# Patient Record
Sex: MALE | Race: BLACK OR AFRICAN AMERICAN | Employment: OTHER | URBAN - METROPOLITAN AREA
[De-identification: names, ages, dates, MRNs, and addresses within clinical notes are randomized per-mention and may not be internally consistent; named-entity substitution may affect disease eponyms.]

---

## 2020-05-15 ENCOUNTER — PROCEDURE VISIT (OUTPATIENT)
Dept: AUDIOLOGY | Age: 65
End: 2020-05-15
Payer: MEDICARE

## 2020-05-15 ENCOUNTER — OFFICE VISIT (OUTPATIENT)
Dept: ENT CLINIC | Age: 65
End: 2020-05-15
Payer: MEDICARE

## 2020-05-15 VITALS
HEIGHT: 68 IN | HEART RATE: 58 BPM | SYSTOLIC BLOOD PRESSURE: 114 MMHG | OXYGEN SATURATION: 96 % | TEMPERATURE: 98 F | WEIGHT: 163 LBS | DIASTOLIC BLOOD PRESSURE: 67 MMHG | BODY MASS INDEX: 24.71 KG/M2

## 2020-05-15 PROCEDURE — 92557 COMPREHENSIVE HEARING TEST: CPT | Performed by: AUDIOLOGIST

## 2020-05-15 PROCEDURE — 3017F COLORECTAL CA SCREEN DOC REV: CPT | Performed by: OTOLARYNGOLOGY

## 2020-05-15 PROCEDURE — 1123F ACP DISCUSS/DSCN MKR DOCD: CPT | Performed by: OTOLARYNGOLOGY

## 2020-05-15 PROCEDURE — G8427 DOCREV CUR MEDS BY ELIG CLIN: HCPCS | Performed by: OTOLARYNGOLOGY

## 2020-05-15 PROCEDURE — 99203 OFFICE O/P NEW LOW 30 MIN: CPT | Performed by: OTOLARYNGOLOGY

## 2020-05-15 PROCEDURE — 4040F PNEUMOC VAC/ADMIN/RCVD: CPT | Performed by: OTOLARYNGOLOGY

## 2020-05-15 PROCEDURE — 4004F PT TOBACCO SCREEN RCVD TLK: CPT | Performed by: OTOLARYNGOLOGY

## 2020-05-15 PROCEDURE — G8417 CALC BMI ABV UP PARAM F/U: HCPCS | Performed by: OTOLARYNGOLOGY

## 2020-05-15 RX ORDER — METHYLPREDNISOLONE 4 MG/1
4 TABLET ORAL SEE ADMIN INSTRUCTIONS
Qty: 1 KIT | Refills: 0 | Status: SHIPPED | OUTPATIENT
Start: 2020-05-15 | End: 2020-07-17 | Stop reason: ALTCHOICE

## 2020-05-15 ASSESSMENT — ENCOUNTER SYMPTOMS
RHINORRHEA: 0
SORE THROAT: 0
TROUBLE SWALLOWING: 0
EYES NEGATIVE: 1
SINUS PAIN: 0
ALLERGIC/IMMUNOLOGIC NEGATIVE: 1
SINUS PRESSURE: 0
FACIAL SWELLING: 0
VOICE CHANGE: 0
RESPIRATORY NEGATIVE: 1

## 2020-06-01 ENCOUNTER — TELEPHONE (OUTPATIENT)
Dept: ENT CLINIC | Age: 65
End: 2020-06-01

## 2020-06-01 RX ORDER — DOXYCYCLINE 100 MG/1
100 TABLET ORAL 2 TIMES DAILY
Qty: 20 TABLET | Refills: 0 | Status: SHIPPED | OUTPATIENT
Start: 2020-06-01 | End: 2020-06-11

## 2020-06-01 RX ORDER — METHYLPREDNISOLONE 4 MG/1
4 TABLET ORAL SEE ADMIN INSTRUCTIONS
Qty: 1 KIT | Refills: 0 | Status: SHIPPED | OUTPATIENT
Start: 2020-06-01 | End: 2020-07-17 | Stop reason: ALTCHOICE

## 2020-06-01 NOTE — TELEPHONE ENCOUNTER
Call from Pt, initial medication helped a lot. Ear still feels full sometimes, some discomfort and frequent  Headaches. Requesting refill or another medication. Please advise.

## 2020-06-19 ENCOUNTER — OFFICE VISIT (OUTPATIENT)
Dept: ENT CLINIC | Age: 65
End: 2020-06-19
Payer: MEDICARE

## 2020-06-19 ENCOUNTER — TELEPHONE (OUTPATIENT)
Dept: ENT CLINIC | Age: 65
End: 2020-06-19

## 2020-06-19 VITALS
BODY MASS INDEX: 24.54 KG/M2 | DIASTOLIC BLOOD PRESSURE: 76 MMHG | WEIGHT: 159 LBS | SYSTOLIC BLOOD PRESSURE: 105 MMHG | HEART RATE: 60 BPM | TEMPERATURE: 97.8 F

## 2020-06-19 PROCEDURE — 4004F PT TOBACCO SCREEN RCVD TLK: CPT | Performed by: OTOLARYNGOLOGY

## 2020-06-19 PROCEDURE — G8427 DOCREV CUR MEDS BY ELIG CLIN: HCPCS | Performed by: OTOLARYNGOLOGY

## 2020-06-19 PROCEDURE — 99213 OFFICE O/P EST LOW 20 MIN: CPT | Performed by: OTOLARYNGOLOGY

## 2020-06-19 PROCEDURE — 4040F PNEUMOC VAC/ADMIN/RCVD: CPT | Performed by: OTOLARYNGOLOGY

## 2020-06-19 PROCEDURE — G8420 CALC BMI NORM PARAMETERS: HCPCS | Performed by: OTOLARYNGOLOGY

## 2020-06-19 PROCEDURE — 3017F COLORECTAL CA SCREEN DOC REV: CPT | Performed by: OTOLARYNGOLOGY

## 2020-06-19 PROCEDURE — 1123F ACP DISCUSS/DSCN MKR DOCD: CPT | Performed by: OTOLARYNGOLOGY

## 2020-06-19 RX ORDER — BUTALBITAL, ACETAMINOPHEN AND CAFFEINE 50; 325; 40 MG/1; MG/1; MG/1
1 TABLET ORAL EVERY 4 HOURS PRN
Qty: 30 TABLET | Refills: 0 | Status: SHIPPED | OUTPATIENT
Start: 2020-06-19

## 2020-06-19 NOTE — TELEPHONE ENCOUNTER
Nel Isbell calling from [facility] scheduling,   MRI needs to be ordered W ith  and With out  Contrast           Or just With out    Contrast     Please place new order for MRI

## 2020-07-07 ENCOUNTER — HOSPITAL ENCOUNTER (OUTPATIENT)
Dept: MRI IMAGING | Age: 65
Discharge: HOME | End: 2020-07-07
Payer: MEDICARE

## 2020-07-07 LAB
CREAT SERPL-MCNC: 1 MG/DL (ref 0.8–1.3)
GFR AFRICAN AMERICAN: >60
GFR NON-AFRICAN AMERICAN: >60

## 2020-07-07 PROCEDURE — 70553 MRI BRAIN STEM W/O & W/DYE: CPT

## 2020-07-07 PROCEDURE — A9577 INJ MULTIHANCE: HCPCS | Performed by: OTOLARYNGOLOGY

## 2020-07-07 PROCEDURE — 36415 COLL VENOUS BLD VENIPUNCTURE: CPT

## 2020-07-07 PROCEDURE — 82565 ASSAY OF CREATININE: CPT

## 2020-07-07 PROCEDURE — 6360000004 HC RX CONTRAST MEDICATION: Performed by: OTOLARYNGOLOGY

## 2020-07-07 RX ADMIN — GADOBENATE DIMEGLUMINE 14 ML: 529 INJECTION, SOLUTION INTRAVENOUS at 11:17

## 2020-07-13 ENCOUNTER — TELEPHONE (OUTPATIENT)
Dept: ENT CLINIC | Age: 65
End: 2020-07-13

## 2020-07-13 NOTE — TELEPHONE ENCOUNTER
Patient calling for MRI results,  He was given Dr Norberto Hernandez recommendations,    And he did schedule his f/u appointment , Patient happy

## 2020-07-17 ENCOUNTER — OFFICE VISIT (OUTPATIENT)
Dept: ENT CLINIC | Age: 65
End: 2020-07-17
Payer: MEDICARE

## 2020-07-17 ENCOUNTER — TELEPHONE (OUTPATIENT)
Dept: ENT CLINIC | Age: 65
End: 2020-07-17

## 2020-07-17 VITALS — TEMPERATURE: 97.8 F | SYSTOLIC BLOOD PRESSURE: 111 MMHG | HEART RATE: 62 BPM | DIASTOLIC BLOOD PRESSURE: 69 MMHG

## 2020-07-17 PROCEDURE — 4040F PNEUMOC VAC/ADMIN/RCVD: CPT | Performed by: OTOLARYNGOLOGY

## 2020-07-17 PROCEDURE — 99213 OFFICE O/P EST LOW 20 MIN: CPT | Performed by: OTOLARYNGOLOGY

## 2020-07-17 PROCEDURE — G8420 CALC BMI NORM PARAMETERS: HCPCS | Performed by: OTOLARYNGOLOGY

## 2020-07-17 PROCEDURE — 3017F COLORECTAL CA SCREEN DOC REV: CPT | Performed by: OTOLARYNGOLOGY

## 2020-07-17 PROCEDURE — 1123F ACP DISCUSS/DSCN MKR DOCD: CPT | Performed by: OTOLARYNGOLOGY

## 2020-07-17 PROCEDURE — G8427 DOCREV CUR MEDS BY ELIG CLIN: HCPCS | Performed by: OTOLARYNGOLOGY

## 2020-07-17 PROCEDURE — 4004F PT TOBACCO SCREEN RCVD TLK: CPT | Performed by: OTOLARYNGOLOGY

## 2020-07-17 NOTE — PROGRESS NOTES
Patient continues to have headaches which she describes as occurring when he is bending over turning his head and also with coughing. Headaches are located in the entire frontal and occipital regions. He denies any eye symptoms and has had no nasal or ear symptoms as well. He has had no loss of consciousness. He is unable to take some of the medications I have prescribed but does do fairly well with the use of ibuprofen for presence of the headaches. MRI scan was totally unremarkable for either sinus or cranial disease. Currently, he appears in no obvious acute distress. There are no focal neurologic signs present. Pupils are equal round regular and reactive to light and accommodation with extraocular movements intact and full. The ears reveal normal tympanic membranes and ear canals bilaterally. Oral examination remains unremarkable. The nasal mucosa is mildly edematous but there is no evidence of purulent drainage or obstruction. The neck remains free of any adenopathy, mass, thyroid enlargement and is supple. He has had a recent eye evaluation which did not reveal any significant pathology. Therefore, I have suggested a neurologic evaluation with a concern as to possible elevated intracranial pressure.

## 2020-09-02 ENCOUNTER — OFFICE VISIT (OUTPATIENT)
Dept: NEUROLOGY | Age: 65
End: 2020-09-02
Payer: MEDICARE

## 2020-09-02 VITALS
BODY MASS INDEX: 24.1 KG/M2 | DIASTOLIC BLOOD PRESSURE: 70 MMHG | SYSTOLIC BLOOD PRESSURE: 107 MMHG | WEIGHT: 159 LBS | HEIGHT: 68 IN | HEART RATE: 62 BPM

## 2020-09-02 PROCEDURE — 4004F PT TOBACCO SCREEN RCVD TLK: CPT | Performed by: PSYCHIATRY & NEUROLOGY

## 2020-09-02 PROCEDURE — 3017F COLORECTAL CA SCREEN DOC REV: CPT | Performed by: PSYCHIATRY & NEUROLOGY

## 2020-09-02 PROCEDURE — G8420 CALC BMI NORM PARAMETERS: HCPCS | Performed by: PSYCHIATRY & NEUROLOGY

## 2020-09-02 PROCEDURE — G8427 DOCREV CUR MEDS BY ELIG CLIN: HCPCS | Performed by: PSYCHIATRY & NEUROLOGY

## 2020-09-02 PROCEDURE — 1123F ACP DISCUSS/DSCN MKR DOCD: CPT | Performed by: PSYCHIATRY & NEUROLOGY

## 2020-09-02 PROCEDURE — 99204 OFFICE O/P NEW MOD 45 MIN: CPT | Performed by: PSYCHIATRY & NEUROLOGY

## 2020-09-02 PROCEDURE — 4040F PNEUMOC VAC/ADMIN/RCVD: CPT | Performed by: PSYCHIATRY & NEUROLOGY

## 2020-09-02 RX ORDER — CLONIDINE HYDROCHLORIDE 0.1 MG/1
TABLET ORAL
COMMUNITY
Start: 2020-08-24

## 2020-09-02 RX ORDER — LISINOPRIL 10 MG/1
TABLET ORAL
COMMUNITY
Start: 2020-07-26

## 2020-09-02 RX ORDER — HYDROCHLOROTHIAZIDE 12.5 MG/1
TABLET ORAL PRN
COMMUNITY
Start: 2020-07-26

## 2020-09-02 RX ORDER — MULTIVITAMIN WITH FOLIC ACID 400 MCG
TABLET ORAL
COMMUNITY
Start: 2020-07-19

## 2020-09-02 RX ORDER — ALPRAZOLAM 1 MG/1
TABLET ORAL
COMMUNITY
Start: 2020-07-30

## 2020-09-02 RX ORDER — CHOLECALCIFEROL (VITAMIN D3) 1250 MCG
CAPSULE ORAL
COMMUNITY
Start: 2020-07-19

## 2020-09-02 NOTE — PROGRESS NOTES
Frequency   []  Hematuria     []  Urinary incontinence           [x] Denies all of the above     Hematologic/lymphatic:  []  Bleeding    []  Easy bruising   []  Anemia  [x] Denies all of the above     Musculoskeletal:   [] Back pain       []  Myalgias    []  Neck pain           [x] Denies all of the above    Neurological: As noted in HPI    Behavioral/Psych:   [x] Anxiety    []  Depression     []  Mood swings     [] Denies all of the above     Endocrine:   []  Temperature intolerance     [] Fatigue      [x] Denies all of the above     Allergic/Immunologic:   [] Hay fever    [x] Denies all of the above     History reviewed. No pertinent past medical history. History reviewed. No pertinent family history.   Social History     Socioeconomic History    Marital status: Single     Spouse name: None    Number of children: None    Years of education: None    Highest education level: None   Occupational History    None   Social Needs    Financial resource strain: None    Food insecurity     Worry: None     Inability: None    Transportation needs     Medical: None     Non-medical: None   Tobacco Use    Smoking status: Current Every Day Smoker     Packs/day: 1.00     Types: Cigarettes    Smokeless tobacco: Never Used   Substance and Sexual Activity    Alcohol use: None     Comment: Ocassional    Drug use: Yes     Types: Marijuana     Comment: Ocassional    Sexual activity: None   Lifestyle    Physical activity     Days per week: None     Minutes per session: None    Stress: None   Relationships    Social connections     Talks on phone: None     Gets together: None     Attends Oriental orthodox service: None     Active member of club or organization: None     Attends meetings of clubs or organizations: None     Relationship status: None    Intimate partner violence     Fear of current or ex partner: None     Emotionally abused: None     Physically abused: None     Forced sexual activity: None   Other Topics Concern    None   Social History Narrative    None       PHYSICAL EXAMINATION:  /70   Pulse 62   Ht 5' 7.5\" (1.715 m)   Wt 159 lb (72.1 kg)   BMI 24.54 kg/m²   Appearance: Well appearing, well nourished and in no distress  Mental Status Exam: Patient is alert, oriented to person, place and time. Recent and remote memory is normal  Fund of Knowledge is normal  Attention/concentration is normal.   Speech : No dysarthria  Language : No aphasia  Funduscopic Exam: sharp disc margins  Cranial Nerves:   II: Visual fields:  Full to confrontation  III: Pupils:  equal, round, reactive to light  III,IV,VI: Extra Ocular Movements are intact. No nystagmus  V: Facial sensation is intact to pin prick and light touch  VII: Facial strength and movements: intact and symmetric smile,cheek puffing and eyebrow elevation  VIII: Hearing:  Intact to finger rub bilaterally  IX: Palate  elevation is symmetric  XI: Shoulder shrug is intact  XII: Tongue movements are normal  Motor:  Muscle tone and bulk are normal.   Strength is symmetrical 5/5 in all four extremities. Sensory: Intact to light touch and  pin prick in all four extremities  Coordination:  Normal  Finger to Nose and Heel to Shin bilaterally    . Reflexes:  DTR 1 and symmetric bilaterally  Plantar response: Flexor bilaterally  Gait: Gait and station is normal. Patient can toe/ heel and tandem walk without difficulty  Romberg: negative  Vascular: No carotid bruit bilaterally        DATA:  LABS:  General Labs:    BMP:    Lab Results   Component Value Date    CREATININE 1.0 07/07/2020    GFRAA >60 07/07/2020    LABGLOM >60 07/07/2020     MRI brain images were reviewed and were normal    IMPRESSION :  Exertional headache  Consider and rule out intracranial aneurysm although it is less likely  MRI brain images were independently reviewed and there is no evidence for intracranial structural lesion. Patient has some temporal tenderness on the left side.   Consider and rule out temporal arteritis  There is also some tenderness over the greater occipital nerve on the left side and occipital neuralgia will also be considered. RECOMMENDATIONS :  Discussed with patient  Discussed about the differential diagnosis  I will get sedimentation rate and C-reactive protein to rule out temporal arteritis  I will get CT angiogram of the head to rule out an intracranial aneurysm  If these are normal then we would consider symptomatic management  Thank you for this consultation        Please note a portion of this chart was generated using dragon dictation software. Although every effort was made to ensure the accuracy of this automated transcription, some errors in transcription may have occurred.

## 2020-09-08 ENCOUNTER — HOSPITAL ENCOUNTER (OUTPATIENT)
Age: 65
Discharge: HOME | End: 2020-09-08
Payer: MEDICARE

## 2020-09-08 LAB
C-REACTIVE PROTEIN: 4.2 MG/L (ref 0–5.1)
SEDIMENTATION RATE, ERYTHROCYTE: 8 MM/HR (ref 0–20)

## 2020-09-08 PROCEDURE — 85652 RBC SED RATE AUTOMATED: CPT

## 2020-09-08 PROCEDURE — 86140 C-REACTIVE PROTEIN: CPT

## 2020-09-08 PROCEDURE — 36415 COLL VENOUS BLD VENIPUNCTURE: CPT

## 2020-09-15 ENCOUNTER — HOSPITAL ENCOUNTER (OUTPATIENT)
Dept: CT IMAGING | Age: 65
Discharge: HOME | End: 2020-09-15
Payer: MEDICARE

## 2020-09-15 ENCOUNTER — TELEPHONE (OUTPATIENT)
Dept: NEUROLOGY | Age: 65
End: 2020-09-15

## 2020-09-15 LAB
BUN BLDV-MCNC: 14 MG/DL (ref 7–20)
CREAT SERPL-MCNC: 1 MG/DL (ref 0.8–1.3)
GFR AFRICAN AMERICAN: >60
GFR NON-AFRICAN AMERICAN: >60

## 2020-09-15 PROCEDURE — 36415 COLL VENOUS BLD VENIPUNCTURE: CPT

## 2020-09-15 PROCEDURE — 6360000004 HC RX CONTRAST MEDICATION: Performed by: PSYCHIATRY & NEUROLOGY

## 2020-09-15 PROCEDURE — 82565 ASSAY OF CREATININE: CPT

## 2020-09-15 PROCEDURE — 84520 ASSAY OF UREA NITROGEN: CPT

## 2020-09-15 PROCEDURE — 70496 CT ANGIOGRAPHY HEAD: CPT

## 2020-09-15 RX ADMIN — IOPAMIDOL 75 ML: 755 INJECTION, SOLUTION INTRAVENOUS at 09:23

## 2020-09-17 ENCOUNTER — TELEPHONE (OUTPATIENT)
Dept: NEUROLOGY | Age: 65
End: 2020-09-17

## 2020-09-17 NOTE — TELEPHONE ENCOUNTER
----- Message from Shahid Solo MD sent at 9/17/2020 10:09 AM EDT -----  Please call patient and inform him that his CT angiogram was normal.  There was no evidence of any aneurysm or other abnormality.   Keep scheduled follow-up appointment

## 2020-10-06 ENCOUNTER — OFFICE VISIT (OUTPATIENT)
Dept: NEUROLOGY | Age: 65
End: 2020-10-06
Payer: MEDICARE

## 2020-10-06 VITALS
HEIGHT: 68 IN | WEIGHT: 159 LBS | HEART RATE: 57 BPM | SYSTOLIC BLOOD PRESSURE: 111 MMHG | BODY MASS INDEX: 24.1 KG/M2 | DIASTOLIC BLOOD PRESSURE: 68 MMHG

## 2020-10-06 PROCEDURE — G8427 DOCREV CUR MEDS BY ELIG CLIN: HCPCS | Performed by: PSYCHIATRY & NEUROLOGY

## 2020-10-06 PROCEDURE — 3017F COLORECTAL CA SCREEN DOC REV: CPT | Performed by: PSYCHIATRY & NEUROLOGY

## 2020-10-06 PROCEDURE — 4004F PT TOBACCO SCREEN RCVD TLK: CPT | Performed by: PSYCHIATRY & NEUROLOGY

## 2020-10-06 PROCEDURE — 4040F PNEUMOC VAC/ADMIN/RCVD: CPT | Performed by: PSYCHIATRY & NEUROLOGY

## 2020-10-06 PROCEDURE — G8484 FLU IMMUNIZE NO ADMIN: HCPCS | Performed by: PSYCHIATRY & NEUROLOGY

## 2020-10-06 PROCEDURE — 1123F ACP DISCUSS/DSCN MKR DOCD: CPT | Performed by: PSYCHIATRY & NEUROLOGY

## 2020-10-06 PROCEDURE — 99213 OFFICE O/P EST LOW 20 MIN: CPT | Performed by: PSYCHIATRY & NEUROLOGY

## 2020-10-06 PROCEDURE — G8420 CALC BMI NORM PARAMETERS: HCPCS | Performed by: PSYCHIATRY & NEUROLOGY

## 2020-10-06 NOTE — PROGRESS NOTES
[facility]   Neurology followup    Subjective:   CC/HP  History was obtained from the patient. Patient states that he continues to have headaches on and off. It may be slightly better. He has good days and bad days. No clear aggravating or relieving factors. The pain could be in the frontal temporal or occipital regions and sometimes could be a band around the head. CT angiogram did not show any evidence of aneurysm. MRI brain did not show any structural abnormalities. Sedimentation rate was normal    REVIEW OF SYSTEMS    Constitutional:  []   Chills   []  Fatigue   []  Fevers   []  Malaise   []  Weight loss     [x] Denies all of the above    Respiratory:   []  Cough    []  Shortness of breath         [x] Denies all of the above     Cardiovascular:   []  Chest pain    []  Exertional chest pressure/discomfort           [] Palpitations    []  Syncope     [x] Denies all of the above        History reviewed. No pertinent past medical history. History reviewed. No pertinent family history.   Social History     Socioeconomic History    Marital status: Single     Spouse name: None    Number of children: None    Years of education: None    Highest education level: None   Occupational History    None   Social Needs    Financial resource strain: None    Food insecurity     Worry: None     Inability: None    Transportation needs     Medical: None     Non-medical: None   Tobacco Use    Smoking status: Current Every Day Smoker     Packs/day: 1.00     Types: Cigarettes    Smokeless tobacco: Never Used   Substance and Sexual Activity    Alcohol use: None     Comment: Ocassional    Drug use: Yes     Types: Marijuana     Comment: Ocassional    Sexual activity: None   Lifestyle    Physical activity     Days per week: None     Minutes per session: None    Stress: None   Relationships    Social connections     Talks on phone: None     Gets together: None     Attends Voodoo service: None     Active member of club or organization: None     Attends meetings of clubs or organizations: None     Relationship status: None    Intimate partner violence     Fear of current or ex partner: None     Emotionally abused: None     Physically abused: None     Forced sexual activity: None   Other Topics Concern    None   Social History Narrative    None        Objective:  Exam:  Ht 5' 7.5\" (1.715 m)   Wt 159 lb (72.1 kg)   BMI 24.54 kg/m²   This is a well-nourished patient in no acute distress  Patient is awake, alert and oriented x3. Speech is normal.  Pupils are equal round reacting to light. Extraocular movements intact. Face symmetrical. Tongue midline. Motor exam shows normal symmetrical strength. Deep tendon reflexes normal. Plantar reflexes downgoing. Sensory exam normal. Coordination normal. Gait normal. No carotid bruit. No neck stiffness. Data :  LABS:  General Labs:    BMP:    Lab Results   Component Value Date    BUN 14 09/15/2020    CREATININE 1.0 09/15/2020    GFRAA >60 09/15/2020    LABGLOM >60 09/15/2020     RADIOLOGY REVIEW:  I have reviewed radiology image(s) and reports(s) of: MRI brain and CT angiogram    Impression :  Probable muscle contraction headache  No evidence of temporal arteritis  No evidence of cerebral aneurysm or structural lesion of the brain    Plan :  Discussed with patient  Explained the test results  Discussed medication options  We finally decided that he will monitor the headaches to see how often they are and how severe they are over the next few months and if they get severe we will try some medication  I will see him on a as needed basis; he will call me if needed. Please note a portion of  this chart was generated using dragon dictation software. Although every effort was made to ensure the accuracy of this automated transcription, some errors in transcription may have occurred.

## 2024-05-02 ENCOUNTER — OFFICE VISIT (OUTPATIENT)
Dept: FAMILY MEDICINE CLINIC | Age: 69
End: 2024-05-02

## 2024-05-02 VITALS
WEIGHT: 152.8 LBS | DIASTOLIC BLOOD PRESSURE: 68 MMHG | BODY MASS INDEX: 23.16 KG/M2 | HEIGHT: 68 IN | HEART RATE: 62 BPM | OXYGEN SATURATION: 98 % | SYSTOLIC BLOOD PRESSURE: 102 MMHG

## 2024-05-02 DIAGNOSIS — Z76.89 ENCOUNTER TO ESTABLISH CARE: ICD-10-CM

## 2024-05-02 DIAGNOSIS — Z01.818 PREOP EXAMINATION: ICD-10-CM

## 2024-05-02 DIAGNOSIS — Z12.5 PROSTATE CANCER SCREENING: ICD-10-CM

## 2024-05-02 DIAGNOSIS — Z00.00 ANNUAL PHYSICAL EXAM: Primary | ICD-10-CM

## 2024-05-02 SDOH — ECONOMIC STABILITY: HOUSING INSECURITY
IN THE LAST 12 MONTHS, WAS THERE A TIME WHEN YOU DID NOT HAVE A STEADY PLACE TO SLEEP OR SLEPT IN A SHELTER (INCLUDING NOW)?: NO

## 2024-05-02 SDOH — ECONOMIC STABILITY: FOOD INSECURITY: WITHIN THE PAST 12 MONTHS, YOU WORRIED THAT YOUR FOOD WOULD RUN OUT BEFORE YOU GOT MONEY TO BUY MORE.: NEVER TRUE

## 2024-05-02 SDOH — ECONOMIC STABILITY: FOOD INSECURITY: WITHIN THE PAST 12 MONTHS, THE FOOD YOU BOUGHT JUST DIDN'T LAST AND YOU DIDN'T HAVE MONEY TO GET MORE.: NEVER TRUE

## 2024-05-02 SDOH — ECONOMIC STABILITY: INCOME INSECURITY: HOW HARD IS IT FOR YOU TO PAY FOR THE VERY BASICS LIKE FOOD, HOUSING, MEDICAL CARE, AND HEATING?: NOT HARD AT ALL

## 2024-05-02 ASSESSMENT — PATIENT HEALTH QUESTIONNAIRE - PHQ9
SUM OF ALL RESPONSES TO PHQ9 QUESTIONS 1 & 2: 0
SUM OF ALL RESPONSES TO PHQ QUESTIONS 1-9: 0
1. LITTLE INTEREST OR PLEASURE IN DOING THINGS: NOT AT ALL
SUM OF ALL RESPONSES TO PHQ QUESTIONS 1-9: 0
2. FEELING DOWN, DEPRESSED OR HOPELESS: NOT AT ALL

## 2024-05-02 ASSESSMENT — ENCOUNTER SYMPTOMS
COUGH: 0
SHORTNESS OF BREATH: 0
CONSTIPATION: 0
DIARRHEA: 0

## 2024-05-02 NOTE — PROGRESS NOTES
Sharath Ruiz is a 68 y.o. year old male here for:    Chief Complaint:    Chief Complaint   Patient presents with    New Patient     Pt is establishing care     Pre-op Exam     Cataract surgery       Subjective:         HPI:       Patient presenting to establish care.  No significant concerns at this time and he is not taking any medications.  He states that he feels well overall.  He does have upcoming cataract surgery planned, but does not have a specific date.  He does take a vitamin D supplement.  No significant family or personal history of any illness.  He is a smoker, but is in the process of trying to quit.  He is established with an eye doctor, but not a dentist.  No other significant concerns.    History reviewed. No pertinent past medical history.  Social History     Tobacco Use    Smoking status: Every Day     Current packs/day: 1.00     Average packs/day: 1 pack/day for 55.3 years (55.3 ttl pk-yrs)     Types: Cigarettes     Start date: 1/1/1969    Smokeless tobacco: Never   Substance Use Topics    Drug use: Yes     Types: Marijuana (Weed)     Comment: Ocassional     History reviewed. No pertinent family history.  History reviewed. No pertinent surgical history.      Current Outpatient Medications:     Cholecalciferol (VITAMIN D3) 1.25 MG (71588 UT) CAPS, TAKE 1 CAPSULE BY MOUTH ONCE A WEEK, Disp: , Rfl:   Allergies   Allergen Reactions    Bactrim        Review of Systems:  Review of Systems   Constitutional:  Negative for fatigue and fever.   HENT:  Negative for congestion.    Respiratory:  Negative for cough and shortness of breath.    Cardiovascular:  Negative for leg swelling.   Gastrointestinal:  Negative for constipation and diarrhea.   Genitourinary:  Negative for dysuria.   Neurological:  Negative for headaches.   Psychiatric/Behavioral:  Negative for sleep disturbance. The patient is not nervous/anxious.        Objective:    Physical Exam:  Vitals:    05/02/24 1454   BP: 102/68   Pulse: 62

## 2024-05-02 NOTE — PATIENT INSTRUCTIONS
About The New Low-Dose CT Lung Cancer Screening Test  [employer] Association  - Low Dose CT Scan of the Chest (Saved By the Scan)

## 2024-05-03 ENCOUNTER — TELEPHONE (OUTPATIENT)
Dept: FAMILY MEDICINE CLINIC | Age: 69
End: 2024-05-03

## 2024-05-03 DIAGNOSIS — Z01.818 PREOP EXAMINATION: ICD-10-CM

## 2024-05-03 DIAGNOSIS — Z00.00 ANNUAL PHYSICAL EXAM: ICD-10-CM

## 2024-05-03 DIAGNOSIS — Z12.5 PROSTATE CANCER SCREENING: ICD-10-CM

## 2024-05-03 LAB
ALBUMIN SERPL-MCNC: 4.2 G/DL (ref 3.4–5)
ALBUMIN/GLOB SERPL: 1.7 {RATIO} (ref 1.1–2.2)
ALP SERPL-CCNC: 58 U/L (ref 40–129)
ALT SERPL-CCNC: 20 U/L (ref 10–40)
ANION GAP SERPL CALCULATED.3IONS-SCNC: 13 MMOL/L (ref 3–16)
AST SERPL-CCNC: 17 U/L (ref 15–37)
BASOPHILS # BLD: 0.1 K/UL (ref 0–0.2)
BASOPHILS NFR BLD: 0.9 %
BILIRUB SERPL-MCNC: 0.4 MG/DL (ref 0–1)
BUN SERPL-MCNC: 14 MG/DL (ref 7–20)
CALCIUM SERPL-MCNC: 9.5 MG/DL (ref 8.3–10.6)
CHLORIDE SERPL-SCNC: 104 MMOL/L (ref 99–110)
CHOLEST SERPL-MCNC: 218 MG/DL (ref 0–199)
CO2 SERPL-SCNC: 23 MMOL/L (ref 21–32)
CREAT SERPL-MCNC: 0.9 MG/DL (ref 0.8–1.3)
DEPRECATED RDW RBC AUTO: 14.2 % (ref 12.4–15.4)
EOSINOPHIL # BLD: 0.3 K/UL (ref 0–0.6)
EOSINOPHIL NFR BLD: 4.1 %
GFR SERPLBLD CREATININE-BSD FMLA CKD-EPI: >90 ML/MIN/{1.73_M2}
GLUCOSE SERPL-MCNC: 86 MG/DL (ref 70–99)
HCT VFR BLD AUTO: 42.4 % (ref 40.5–52.5)
HDLC SERPL-MCNC: 69 MG/DL (ref 40–60)
HGB BLD-MCNC: 14.3 G/DL (ref 13.5–17.5)
LDLC SERPL CALC-MCNC: 136 MG/DL
LYMPHOCYTES # BLD: 1.9 K/UL (ref 1–5.1)
LYMPHOCYTES NFR BLD: 30.1 %
MCH RBC QN AUTO: 30.3 PG (ref 26–34)
MCHC RBC AUTO-ENTMCNC: 33.7 G/DL (ref 31–36)
MCV RBC AUTO: 89.9 FL (ref 80–100)
MONOCYTES # BLD: 0.5 K/UL (ref 0–1.3)
MONOCYTES NFR BLD: 8.8 %
NEUTROPHILS # BLD: 3.5 K/UL (ref 1.7–7.7)
NEUTROPHILS NFR BLD: 56.1 %
PLATELET # BLD AUTO: 193 K/UL (ref 135–450)
PMV BLD AUTO: 9.5 FL (ref 5–10.5)
POTASSIUM SERPL-SCNC: 4.2 MMOL/L (ref 3.5–5.1)
PROT SERPL-MCNC: 6.7 G/DL (ref 6.4–8.2)
PSA SERPL DL<=0.01 NG/ML-MCNC: 0.89 NG/ML (ref 0–4)
RBC # BLD AUTO: 4.72 M/UL (ref 4.2–5.9)
SODIUM SERPL-SCNC: 140 MMOL/L (ref 136–145)
TRIGL SERPL-MCNC: 65 MG/DL (ref 0–150)
VLDLC SERPL CALC-MCNC: 13 MG/DL
WBC # BLD AUTO: 6.2 K/UL (ref 4–11)

## 2024-05-03 NOTE — TELEPHONE ENCOUNTER
Pt saw Dr Sanchez as a New Patient May 2nd.  Pt was told to call back with information on his cataract surgery.  Date of surgery - June 26th - [facility] in [place] - Dr Leonardo Ovalle  [facility] will fax Pre-Op paperwork today.

## 2024-05-04 LAB
EST. AVERAGE GLUCOSE BLD GHB EST-MCNC: 119.8 MG/DL
HBA1C MFR BLD: 5.8 %

## 2024-05-29 ENCOUNTER — OFFICE VISIT (OUTPATIENT)
Dept: FAMILY MEDICINE CLINIC | Age: 69
End: 2024-05-29
Payer: MEDICARE

## 2024-05-29 VITALS
HEART RATE: 63 BPM | HEIGHT: 68 IN | DIASTOLIC BLOOD PRESSURE: 68 MMHG | SYSTOLIC BLOOD PRESSURE: 112 MMHG | OXYGEN SATURATION: 98 % | BODY MASS INDEX: 23.19 KG/M2 | WEIGHT: 153 LBS

## 2024-05-29 DIAGNOSIS — R10.31 CHRONIC RLQ PAIN: ICD-10-CM

## 2024-05-29 DIAGNOSIS — Z01.818 PRE-OP EXAM: ICD-10-CM

## 2024-05-29 DIAGNOSIS — G89.29 CHRONIC RLQ PAIN: ICD-10-CM

## 2024-05-29 DIAGNOSIS — Z01.818 PRE-OP EXAM: Primary | ICD-10-CM

## 2024-05-29 LAB
ANION GAP SERPL CALCULATED.3IONS-SCNC: 10 MMOL/L (ref 3–16)
BASOPHILS # BLD: 0 K/UL (ref 0–0.2)
BASOPHILS NFR BLD: 0.6 %
BUN SERPL-MCNC: 14 MG/DL (ref 7–20)
CALCIUM SERPL-MCNC: 9.3 MG/DL (ref 8.3–10.6)
CHLORIDE SERPL-SCNC: 103 MMOL/L (ref 99–110)
CO2 SERPL-SCNC: 26 MMOL/L (ref 21–32)
CREAT SERPL-MCNC: 1 MG/DL (ref 0.8–1.3)
DEPRECATED RDW RBC AUTO: 14.1 % (ref 12.4–15.4)
EOSINOPHIL # BLD: 0.3 K/UL (ref 0–0.6)
EOSINOPHIL NFR BLD: 5 %
GFR SERPLBLD CREATININE-BSD FMLA CKD-EPI: 81 ML/MIN/{1.73_M2}
GLUCOSE SERPL-MCNC: 88 MG/DL (ref 70–99)
HCT VFR BLD AUTO: 43.3 % (ref 40.5–52.5)
HGB BLD-MCNC: 14.7 G/DL (ref 13.5–17.5)
LYMPHOCYTES # BLD: 1.4 K/UL (ref 1–5.1)
LYMPHOCYTES NFR BLD: 21.8 %
MCH RBC QN AUTO: 30.6 PG (ref 26–34)
MCHC RBC AUTO-ENTMCNC: 33.9 G/DL (ref 31–36)
MCV RBC AUTO: 90.3 FL (ref 80–100)
MONOCYTES # BLD: 0.4 K/UL (ref 0–1.3)
MONOCYTES NFR BLD: 7.2 %
NEUTROPHILS # BLD: 4.1 K/UL (ref 1.7–7.7)
NEUTROPHILS NFR BLD: 65.4 %
PLATELET # BLD AUTO: 199 K/UL (ref 135–450)
PMV BLD AUTO: 9.4 FL (ref 5–10.5)
POTASSIUM SERPL-SCNC: 4.3 MMOL/L (ref 3.5–5.1)
RBC # BLD AUTO: 4.8 M/UL (ref 4.2–5.9)
SODIUM SERPL-SCNC: 139 MMOL/L (ref 136–145)
WBC # BLD AUTO: 6.2 K/UL (ref 4–11)

## 2024-05-29 PROCEDURE — 99214 OFFICE O/P EST MOD 30 MIN: CPT | Performed by: STUDENT IN AN ORGANIZED HEALTH CARE EDUCATION/TRAINING PROGRAM

## 2024-05-29 PROCEDURE — 1123F ACP DISCUSS/DSCN MKR DOCD: CPT | Performed by: STUDENT IN AN ORGANIZED HEALTH CARE EDUCATION/TRAINING PROGRAM

## 2024-05-29 PROCEDURE — G8420 CALC BMI NORM PARAMETERS: HCPCS | Performed by: STUDENT IN AN ORGANIZED HEALTH CARE EDUCATION/TRAINING PROGRAM

## 2024-05-29 PROCEDURE — 3017F COLORECTAL CA SCREEN DOC REV: CPT | Performed by: STUDENT IN AN ORGANIZED HEALTH CARE EDUCATION/TRAINING PROGRAM

## 2024-05-29 PROCEDURE — 93000 ELECTROCARDIOGRAM COMPLETE: CPT | Performed by: STUDENT IN AN ORGANIZED HEALTH CARE EDUCATION/TRAINING PROGRAM

## 2024-05-29 PROCEDURE — 4004F PT TOBACCO SCREEN RCVD TLK: CPT | Performed by: STUDENT IN AN ORGANIZED HEALTH CARE EDUCATION/TRAINING PROGRAM

## 2024-05-29 PROCEDURE — G8427 DOCREV CUR MEDS BY ELIG CLIN: HCPCS | Performed by: STUDENT IN AN ORGANIZED HEALTH CARE EDUCATION/TRAINING PROGRAM

## 2024-05-29 ASSESSMENT — ENCOUNTER SYMPTOMS
COUGH: 0
ABDOMINAL PAIN: 1
DIARRHEA: 0
CONSTIPATION: 0
SHORTNESS OF BREATH: 0

## 2024-05-29 NOTE — PROGRESS NOTES
[facility]  Preoperative visit   5/29/2024    Patient is here for preop evaluation at the request of Dr. Ovalle for cataract removal . Is scheduled for surgery on 06/26/2024    Patient also reporting intermittent right lower quadrant abdominal pain, states that it only lasts for 30 to 60 minutes at a time.  No bowel or bladder changes.  He does not report any bulging in the abdomen.  No injury.  This has been gradual over the past several months.  No history of previous surgery.  Denies any fever, chills, chest pain, shortness of breath, nausea/diarrhea/vomiting, rashes or any other symptoms apart from mild tenderness.  Reports the pain as 1-3 out of 10 depending on the day.  He states that he did not mention it at his previous appointment as he was hoping that it would just go away.  No other significant concerns.    Functional Assessment:  Exercise tolerance: >4 METS using the DASI calculator   Denies any current chest pain or discomfort, sob or SMITH, orthopnea, PND or leg swelling.     Medications: reviewed, Over the counter (NSAIDs) use: NA    Cardiac Risk:  High-risk Surgery: No / Hx of ischemic heart disease: No / Hx of CHF: No / Hx of CVA: No / Pre-op insulin: No / Pre-op creatinine >2.0: No (last cr 0.9 )    YANICK Screen:  Snore loudly? No / Feel tired/fatigued during the day? No / Stop breathing while sleeping? No / High blood pressure? No / Morning HA? No     History of surgery/ anesthesia:  - No hx of complications, anesthesia reaction, bleeding, bruising, clots  - No family Hx of reactions to anesthesia/ bleeding/clots  - Support systems after surgery: Yes  - Smoking/ alcohol/drugs: No  - Complicating medical diseases are currently well controlled.     Problem List  There is no problem list on file for this patient.      Medical and Surgical History  Past Medical History:   Diagnosis Date    ADHD (attention deficit hyperactivity disorder)      History reviewed. No pertinent surgical

## 2024-06-03 ENCOUNTER — TELEPHONE (OUTPATIENT)
Dept: FAMILY MEDICINE CLINIC | Age: 69
End: 2024-06-03

## 2024-06-03 ENCOUNTER — HOSPITAL ENCOUNTER (OUTPATIENT)
Dept: ULTRASOUND IMAGING | Age: 69
Discharge: HOME | End: 2024-06-03
Attending: STUDENT IN AN ORGANIZED HEALTH CARE EDUCATION/TRAINING PROGRAM
Payer: MEDICARE

## 2024-06-03 DIAGNOSIS — G89.29 CHRONIC RLQ PAIN: ICD-10-CM

## 2024-06-03 DIAGNOSIS — R10.31 CHRONIC RLQ PAIN: ICD-10-CM

## 2024-06-03 PROCEDURE — 76705 ECHO EXAM OF ABDOMEN: CPT

## 2024-06-03 NOTE — TELEPHONE ENCOUNTER
KENJI for Dr. [facility] called re: US Abd.complete  Patient was advised that the US abd.complete covers many internal organs  Patient states he thinks he has a hernia  Order was changed  to soft tissue US  Patient is aware if the phys.requires a more complete exam , the patient will have to come back again  Radiology did not want to charge patient for US abd.complete if not necessary

## 2024-06-10 ENCOUNTER — HOSPITAL ENCOUNTER (OUTPATIENT)
Dept: CT IMAGING | Age: 69
Discharge: HOME | End: 2024-06-10
Attending: STUDENT IN AN ORGANIZED HEALTH CARE EDUCATION/TRAINING PROGRAM
Payer: MEDICARE

## 2024-06-10 DIAGNOSIS — R10.31 CHRONIC RLQ PAIN: ICD-10-CM

## 2024-06-10 DIAGNOSIS — G89.29 CHRONIC RLQ PAIN: ICD-10-CM

## 2024-06-10 PROCEDURE — 74176 CT ABD & PELVIS W/O CONTRAST: CPT

## 2024-06-16 NOTE — PROGRESS NOTES
Total Time: minutes: 11-20 minutes     Sharath Ruiz was evaluated through a synchronous (real-time) audio encounter. Patient identification was verified at the start of the visit. He (or guardian if applicable) is aware that this is a billable service, which includes applicable co-pays. This visit was conducted with the patient's (and/or legal guardian's) verbal consent. He has not had a related appointment within my department in the past 7 days or scheduled within the next 24 hours.   The patient was located at Home: [address] [place], [place] [id number].  The provider was located at Facility (Appt Dept): [address] [place] [address].    Note: not billable if this call serves to triage the patient into an appointment for the relevant concern  Yes, I confirm.   Sharath Ruiz is a 68 y.o. male evaluated via telephone on 6/17/2024 for No chief complaint on file.  .      Assessment & Plan   Right lower quadrant pain-suspect muscular   Heat, rest, ibuprofen 400 mg 3 times daily with food for a week  Return to office as needed  Warning signs discussed  Subjective   During a preop physical with Dr. Peralta on May 29 he mentioned that he had some right lower quadrant abdominal pain  HPI  Pain is off-and-on today he has not  No real remembers no strain or injury  Denies any diarrhea bloody stools nausea vomiting fever chills unexplained weight loss blood in the urine frequent urination.  Review of Systems    See above  Objective ultrasound of the abdomen negative  CT scan of the abdomen: Diverticulosis without diverticulitis, small left inguinal hernia otherwise unremarkable  Physical exam General in no acute distress pleasant coherent oriented x 3  No facial asymmetry noted  Lungs no audible wheezes or rhonchi or cough  Affect normal  Patient-Reported Vitals  No data recorded       Shahriar Muñoz MD

## 2024-06-17 ENCOUNTER — TELEMEDICINE (OUTPATIENT)
Dept: FAMILY MEDICINE CLINIC | Age: 69
End: 2024-06-17
Payer: MEDICARE

## 2024-06-17 DIAGNOSIS — R10.31 RIGHT LOWER QUADRANT ABDOMINAL PAIN: Primary | ICD-10-CM

## 2024-06-17 PROCEDURE — 3017F COLORECTAL CA SCREEN DOC REV: CPT | Performed by: FAMILY MEDICINE

## 2024-06-17 PROCEDURE — 99213 OFFICE O/P EST LOW 20 MIN: CPT | Performed by: FAMILY MEDICINE

## 2024-06-17 PROCEDURE — 1123F ACP DISCUSS/DSCN MKR DOCD: CPT | Performed by: FAMILY MEDICINE

## 2024-06-17 PROCEDURE — 4004F PT TOBACCO SCREEN RCVD TLK: CPT | Performed by: FAMILY MEDICINE

## 2024-06-17 PROCEDURE — G8428 CUR MEDS NOT DOCUMENT: HCPCS | Performed by: FAMILY MEDICINE

## 2024-06-17 PROCEDURE — G8420 CALC BMI NORM PARAMETERS: HCPCS | Performed by: FAMILY MEDICINE

## 2024-06-20 ENCOUNTER — TELEPHONE (OUTPATIENT)
Dept: FAMILY MEDICINE CLINIC | Age: 69
End: 2024-06-20

## 2024-06-20 NOTE — TELEPHONE ENCOUNTER
Mayte with [facility] asked that we fax the following:    Recent labs  EKG  CT Abdomen    Fax no. [phone]    (DONE)